# Patient Record
Sex: FEMALE | Race: WHITE | Employment: PART TIME | URBAN - METROPOLITAN AREA
[De-identification: names, ages, dates, MRNs, and addresses within clinical notes are randomized per-mention and may not be internally consistent; named-entity substitution may affect disease eponyms.]

---

## 2019-10-09 ENCOUNTER — HOSPITAL ENCOUNTER (OUTPATIENT)
Dept: PHYSICAL THERAPY | Age: 69
Discharge: HOME | End: 2019-10-09
Payer: COMMERCIAL

## 2019-10-09 PROCEDURE — 97162 PT EVAL MOD COMPLEX 30 MIN: CPT

## 2019-10-09 PROCEDURE — 97110 THERAPEUTIC EXERCISES: CPT

## 2019-10-09 NOTE — PROGRESS NOTES
2255   PHYSICAL THERAPY AT [facility] [address] [place], [address]  Phone: [phone]  Fax: [phone] / [address] [facility]  Patient Name: Juana Lisa : 1950   Medical   Diagnosis: Back pain [M54.9] Treatment Diagnosis: LBP   Onset Date: 2-3 months ago     Referral Source: Erica Chance MD [facility]): 10/9/2019   Prior Hospitalization: See medical history Provider #: [id number]   Prior Level of Function: Independent all ADL's   Comorbidities: Arthritis, thyroid, Asthma, Stroke, R TKR   Medications: Verified on Patient Summary List   The Plan of Care and following information is based on the information from the initial evaluation.   ==================================================================================  Assessment / key information:  Patient is a 68 y.o. female who presents to [facility] with Dx of LBP and postural disfunction. Patient reports initial onset of sx on 2-3 months ago in her L/S which were of unknown etiology. She reports a period of LE weakness that has since resolved. No imaging at this time. Denies N&T down the legs. Lives in a single story home with 4-5 steps w/ rail to enter. Reports 2-3 year h/o worsening postural discomfort . Patient reports sx are intermittent in nature with worsening of sx with increased standing time >1 hour. Sx improve with heat, stretching. Also reports left hip bursitis. Average reported pain level at 2-3/10, 5-6/10 at worst & 0/10 at best located along sacral borders bilaterally. [facility] objective evaluation patient demonstrates Forward head posture and dowager's hump. Decreased segmental mobility throughout T/S and L/S with joint mobilization.   SI alignment presents with sacral rotation to the left, L ASIS elevated and inflared to 13.5cm(vs14.5 on R) indicating L out flare and posterior innominate vs R anterior. MMT reveals 4/5 throughout bilateral hip rotators, extensor, and abductors. 4/5 scapular stabilizers and lower trapezius. Patient can benefit from PT interventions to improve posture, ROM, strength, SI alignment, decrease pain, to facilitate ADLs & overall functional status.   ==================================================================================  Eval Complexity: History HIGH Complexity :3+ comorbidities / personal factors will impact the outcome/ POC ;  Examination  MEDIUM Complexity : 3 Standardized tests and measures addressing body structure, function, activity limitation and / or participation in recreation ; Presentation MEDIUM Complexity : Evolving with changing characteristics ; Decision Making MEDIUM Complexity : FOTO score of 26-74; Overall Complexity MEDIUM  Problem List: pain affecting function, decrease ROM, decrease strength, impaired gait/ balance, decrease ADL/ functional abilitiies, decrease activity tolerance, decrease flexibility/ joint mobility and other FOTO 68/100   Treatment Plan may include any combination of the following: Therapeutic exercise, Therapeutic activities, Neuromuscular re-education, Physical agent/modality, Gait/balance training, Manual therapy and Patient education  Patient / Family readiness to learn indicated by: asking questions and trying to perform skills  Persons(s) to be included in education: patient (P)  Barriers to Learning/Limitations: None  Measures taken:    Patient Goal (s): \"Strengthen muscles, improve posture. \"   Patient self reported health status: good  Rehabilitation Potential: good   Short Term Goals: To be accomplished in  2-3  weeks:  1) Patient to be independent and compliant with initial HEP to prevent further disability. 2) Patient will report decreased c/o pain to < or = 2/10 at worst  to facilitate increased standing tolerance to >30 w/o pain with manageable sx in the lower and upper back.   3) Increase GROC to +2 indicating improved functional mobility   Long Term Goals: To be accomplished in  4-6  weeks:  1)Patient to be independent & compliant with progressive HEP in preparation for D/C.  2)  Patient will tolerate standing >1 hour w/o pain to allow for patient to cook dinner and wash dishes. 3) Improve FOTO score from 68 points to > or = 69 points indicating improved tolerance with ADLs in regards to lower back. Frequency / Duration:   Patient to be seen  2-3  times per week for 4-6  weeks:  Patient / Caregiver education and instruction: self care, activity modification and exercises  G-Codes (GP): n/a  Therapist Signature: Akira Robles PT Date: 52/4/8734   Certification Period: 10/9/19 to 1/7/20 Time: 8:26 AM   ==================================================================================  I certify that the above Physical Therapy Services are being furnished while the patient is under my care. I agree with the treatment plan and certify that this therapy is necessary. Physician Signature:        Date:       Time:     Please sign and return to [facility] or you may fax the signed copy to [phone]. Thank you.

## 2019-10-09 NOTE — PROGRESS NOTES
PHYSICAL THERAPY - DAILY TREATMENT NOTE     Patient Name: Avinash Vazquez        Date: 10/9/2019  : 1950   YES Patient  Verified  Visit #:      12  Insurance: Payor: /      In time: 1100 Out time: 1200   Total Treatment Time: 60     Medicare/BCBS Time Tracking (below)   Total Timed Codes (min):   1:1 Treatment Time:       TREATMENT AREA =  Back pain [M54.9]    SUBJECTIVE    Pain Level (on 0 to 10 scale):  0  / 10   Medication Changes/New allergies or changes in medical history, any new surgeries or procedures?     NO    If yes, update Summary List   Subjective Functional Status/Changes:  []  No changes reported       See POC         OBJECTIVE  Modalities Rationale:     decrease pain and increase tissue extensibility to improve patient's ability to perform ADL's w/o pain      min [] Estim, type/location:                                      []  att     []  unatt     []  w/US     []  w/ice    []  w/heat    min []  Mechanical Traction: type/lbs                   []  pro   []  sup   []  int   []  cont    []  before manual    []  after manual    min []  Ultrasound, settings/location:      min []  Iontophoresis w/ dexamethasone, location:                                               []  take home patch       []  in clinic   10 min []  Ice     [x]  Heat    location/position: Supine to C/S, T/S and L/S    min []  Vasopneumatic Device, press/temp:     min []  Other:    [x] Skin assessment post-treatment (if applicable):    [x]  intact    [x]  redness- no adverse reaction     []redness  adverse reaction:      15 min Therapeutic Exercise:  [x]  See flow sheet   Rationale:      increase ROM and increase strength to improve the patients ability to perform ADL's     Billed With/As:   [x] TE   [] TA   [] Neuro   [] Self Care Patient Education: [x] Review HEP    [] Progressed/Changed HEP based on:   [] positioning   [] body mechanics   [] transfers   [] heat/ice application    [] other:        Other Objective/Functional Measures:    See POC  See TE Flow sheet     Post Treatment Pain Level (on 0 to 10) scale:   0  / 10     ASSESSMENT    Assessment/Changes in Function:     See POC     []  See Progress Note/Recertification   Patient will continue to benefit from skilled PT services to modify and progress therapeutic interventions, address functional mobility deficits, address ROM deficits, address strength deficits, analyze and address soft tissue restrictions, analyze and cue movement patterns, analyze and modify body mechanics/ergonomics and assess and modify postural abnormalities to attain remaining goals.       Progress toward goals / Updated goals:    See POC     PLAN    [x]  Upgrade activities as tolerated YES Continue plan of care   []  Discharge due to :    []  Other:      Therapist: Mariah De León PT    Date: 10/9/2019 Time: 8:26 AM     Future Appointments   Date Time Provider Omar Brooks   10/9/2019 11:00 AM Alexa Ferguson, PT REHAB CENTER AT [facility]

## 2019-10-18 ENCOUNTER — HOSPITAL ENCOUNTER (OUTPATIENT)
Dept: PHYSICAL THERAPY | Age: 69
Discharge: HOME | End: 2019-10-18
Payer: COMMERCIAL

## 2019-10-18 PROCEDURE — 97140 MANUAL THERAPY 1/> REGIONS: CPT

## 2019-10-18 PROCEDURE — 97110 THERAPEUTIC EXERCISES: CPT

## 2019-10-18 NOTE — PROGRESS NOTES
PHYSICAL THERAPY - DAILY TREATMENT NOTE     Patient Name: Avinash Vazquez        Date: 10/18/2019  : 1950   YES Patient  Verified  Visit #:     Insurance: Payor: Noa Gallegos / Plan: [address] PT / Product Type: Commerical /      In time: 1100 Out time: 9565   Total Treatment Time: 70     Medicare/BCBS Time Tracking (below)   Total Timed Codes (min):   1:1 Treatment Time:       TREATMENT AREA =  Back pain [M54.9]    SUBJECTIVE    Pain Level (on 0 to 10 scale):  0  / 10   Medication Changes/New allergies or changes in medical history, any new surgeries or procedures? NO    If yes, update Summary List   Subjective Functional Status/Changes:  []  No changes reported       Functional improvements: \"I feel pretty good. My hip bursitis is better with the alignment correction. \"  Functional impairments: Decreased strength increased pain affecting mobility and function         OBJECTIVE  Modalities Rationale:     decrease pain and increase tissue extensibility to improve patient's ability to perform ADL's w/o pain      min [] Estim, type/location:                                      []  att     []  unatt     []  w/US     []  w/ice    []  w/heat    min []  Mechanical Traction: type/lbs                   []  pro   []  sup   []  int   []  cont    []  before manual    []  after manual    min []  Ultrasound, settings/location:      min []  Iontophoresis w/ dexamethasone, location:                                               []  take home patch       []  in clinic   10 min []  Ice     [x]  Heat    location/position: Supine L/S through C/S    min []  Vasopneumatic Device, press/temp:     min []  Other:    [x] Skin assessment post-treatment (if applicable):    [x]  intact    [x]  redness- no adverse reaction     []redness  adverse reaction:      45 min Therapeutic Exercise:  [x]  See flow sheet   Rationale:      increase ROM and increase strength to improve the patients ability to perform ADL's     15 min Manual Therapy: Technique:      [x] S/DTM []IASTM []PROM [] Passive Stretching   [x]manual TPR    []Jt manipulation:Gr I [] II []  III [] IV[] V[]  Treatment Area:  ITB, glut med   Rationale:      decrease pain, increase ROM and increase tissue extensibility to improve patient's ability to perform ADL's    Billed With/As:   [x] TE   [] TA   [] Neuro   [] Self Care Patient Education: [x] Review HEP    [] Progressed/Changed HEP based on:   [] positioning   [] body mechanics   [] transfers   [] heat/ice application    [] other:        Other Objective/Functional Measures:    Began therex per flow sheet   Post Treatment Pain Level (on 0 to 10) scale:   0  / 10     ASSESSMENT    Assessment/Changes in Function:     Good tolerance to therex program today. Improved alignment. Continues to have increased fatigue. []  See Progress Note/Recertification   Patient will continue to benefit from skilled PT services to modify and progress therapeutic interventions, address functional mobility deficits, address ROM deficits, address strength deficits, analyze and address soft tissue restrictions, analyze and cue movement patterns, analyze and modify body mechanics/ergonomics and assess and modify postural abnormalities to attain remaining goals.       Progress toward goals / Updated goals:    Progressing towards recently established goals     PLAN    [x]  Upgrade activities as tolerated YES Continue plan of care   []  Discharge due to :    []  Other:      Therapist: Tianna Dawn PT    Date: 10/18/2019 Time: 8:26 AM     Future Appointments   Date Time Provider Omar Brooks   10/18/2019 11:00 AM Beuford Meigs, PT REHAB CENTER AT [facility]   10/23/2019 11:00 AM Beuford Meigs, PT REHAB CENTER AT [facility]   10/25/2019 10:30 AM Beuford Meigs, PT REHAB CENTER AT [facility]   10/30/2019 11:00 AM Beuford Meigs, PT REHAB CENTER AT [facility]   11/6/2019 11:00 AM Beuford Meigs, PT REHAB CENTER AT [facility]   11/13/2019 11:00 AM Beuford Meigs, PT REHAB CENTER AT [facility]   11/15/2019 11:00 AM Trish Nieto PT REHAB CENTER AT [facility]   11/20/2019 11:00 AM Trish Nieto PT REHAB CENTER AT [facility]

## 2019-10-23 ENCOUNTER — HOSPITAL ENCOUNTER (OUTPATIENT)
Dept: PHYSICAL THERAPY | Age: 69
Discharge: HOME | End: 2019-10-23
Payer: COMMERCIAL

## 2019-10-23 PROCEDURE — 97140 MANUAL THERAPY 1/> REGIONS: CPT

## 2019-10-23 PROCEDURE — 97110 THERAPEUTIC EXERCISES: CPT

## 2019-10-23 NOTE — PROGRESS NOTES
PHYSICAL THERAPY - DAILY TREATMENT NOTE     Patient Name: Clive Perkins        Date: 10/23/2019  : 1950   YES Patient  Verified  Visit #:   3   of   12  Insurance: Payor: Preet Du / Plan: [address] PT / Product Type: Commerical /      In time: 2905 Out time: 4445   Total Treatment Time: 70     Medicare/[facility] Time Tracking (below)   Total Timed Codes (min):   1:1 Treatment Time:       TREATMENT AREA =  Back pain [M54.9]    SUBJECTIVE    Pain Level (on 0 to 10 scale):  0  / 10   Medication Changes/New allergies or changes in medical history, any new surgeries or procedures? NO    If yes, update Summary List   Subjective Functional Status/Changes:  []  No changes reported       Functional improvements: \"I was so sore after the last visit. I could tell I hadn't used these muscles in a long time. \"  Functional impairments: Decreased strength affecting ADL's         OBJECTIVE  Modalities Rationale:     decrease pain and increase tissue extensibility to improve patient's ability to perform ADL's w/o pain      min [] Estim, type/location:                                      []  att     []  unatt     []  w/US     []  w/ice    []  w/heat    min []  Mechanical Traction: type/lbs                   []  pro   []  sup   []  int   []  cont    []  before manual    []  after manual    min []  Ultrasound, settings/location:      min []  Iontophoresis w/ dexamethasone, location:                                               []  take home patch       []  in clinic   10 min []  Ice     [x]  Heat    location/position: Supine to spine and R shoulder    min []  Vasopneumatic Device, press/temp:     min []  Other:    [x] Skin assessment post-treatment (if applicable):    [x]  intact    [x]  redness- no adverse reaction     []redness  adverse reaction:      35 min Therapeutic Exercise:  [x]  See flow sheet   Rationale:      increase ROM and increase strength to improve the patients ability to perform work activities without pain/fatigue     25 min Manual Therapy: Technique:      [x] S/DTM []IASTM []PROM [] Passive Stretching   [x]manual TPR    []Jt manipulation:Gr I [] II []  III [] IV[] V[]  Treatment Area:  L glut med, piriformis, QL   Rationale:      decrease pain, increase ROM and increase tissue extensibility to improve patient's ability to perform ADL's and maintain alignment    Billed With/As:   [x] TE   [] TA   [] Neuro   [] Self Care Patient Education: [x] Review HEP    [] Progressed/Changed HEP based on:   [] positioning   [] body mechanics   [] transfers   [] heat/ice application    [] other:        Other Objective/Functional Measures: Added deadbug and 1/2 foam roll for T/S ext     Post Treatment Pain Level (on 0 to 10) scale:   0  / 10     ASSESSMENT    Assessment/Changes in Function:     Improved soft tissue mobility and pain following MT. Improved tolerance to therex w/ break down of repetitions. []  See Progress Note/Recertification   Patient will continue to benefit from skilled PT services to modify and progress therapeutic interventions, address functional mobility deficits, address ROM deficits, address strength deficits, analyze and address soft tissue restrictions, analyze and cue movement patterns, analyze and modify body mechanics/ergonomics and assess and modify postural abnormalities to attain remaining goals.       Progress toward goals / Updated goals:    Good progress towards STG's today     PLAN    [x]  Upgrade activities as tolerated YES Continue plan of care   []  Discharge due to :    []  Other:      Therapist: Tianna Dawn PT    Date: 10/23/2019 Time: 8:28 AM     Future Appointments   Date Time Provider Omar Brooks   10/23/2019 11:00 AM Beuford Meigs, PT REHAB CENTER AT [facility]   10/25/2019 10:30 AM Beuford Meigs, PT REHAB CENTER AT [facility]   10/30/2019 11:00 AM Beuford Meigs, PT REHAB CENTER AT [facility]   11/6/2019 11:00 AM Beuford Meigs, PT REHAB CENTER AT [facility]   11/13/2019 11:00 AM Beuford Meigs, PT REHAB CENTER AT [facility]   11/15/2019 11:00 AM Alexa Ferguson, PT REHAB CENTER AT [facility]   11/20/2019 11:00 AM Alexa Ferguson, PT REHAB CENTER AT [facility]

## 2019-10-25 ENCOUNTER — HOSPITAL ENCOUNTER (OUTPATIENT)
Dept: PHYSICAL THERAPY | Age: 69
Discharge: HOME | End: 2019-10-25
Payer: COMMERCIAL

## 2019-10-25 PROCEDURE — 97140 MANUAL THERAPY 1/> REGIONS: CPT

## 2019-10-25 PROCEDURE — 97110 THERAPEUTIC EXERCISES: CPT

## 2019-10-25 NOTE — PROGRESS NOTES
PHYSICAL THERAPY - DAILY TREATMENT NOTE     Patient Name: Uli Worley        Date: 10/25/2019  : 1950   YES Patient  Verified  Visit #:     Insurance: Payor: Isabel Muñoz / Plan: [address] PT / Product Type: Commerical /      In time: 5640 Out time: 1140   Total Treatment Time: 65     Medicare/BCBS Time Tracking (below)   Total Timed Codes (min):   1:1 Treatment Time:       TREATMENT AREA =  Back pain [M54.9]    SUBJECTIVE    Pain Level (on 0 to 10 scale):  0  / 10   Medication Changes/New allergies or changes in medical history, any new surgeries or procedures? NO    If yes, update Summary List   Subjective Functional Status/Changes:  []  No changes reported       Functional improvements: \"I'm noticing I can stand about 40 min before I reach 5-6/10 pain. \"  Functional impairments: Decreased core strength and          OBJECTIVE  Modalities Rationale:     decrease pain and increase tissue extensibility to improve patient's ability to perform work related activities without pain      min [] Estim, type/location:                                      []  att     []  unatt     []  w/US     []  w/ice    []  w/heat    min []  Mechanical Traction: type/lbs                   []  pro   []  sup   []  int   []  cont    []  before manual    []  after manual    min []  Ultrasound, settings/location:      min []  Iontophoresis w/ dexamethasone, location:                                               []  take home patch       []  in clinic   10 min []  Ice     [x]  Heat    location/position: Supine to T/S, C/S    min []  Vasopneumatic Device, press/temp:     min []  Other:    [x] Skin assessment post-treatment (if applicable):    [x]  intact    [x]  redness- no adverse reaction     []redness  adverse reaction:      40 min Therapeutic Exercise:  [x]  See flow sheet   Rationale:      increase ROM and increase strength to improve the patients ability to perform ADl's     15 min Manual Therapy: Technique:      [x] S/DTM []IASTM []PROM [] Passive Stretching   []manual TPR    []Jt manipulation:Gr I [] II []  III [x] IV[] V[]  Treatment Area:  STM to left QL, PA mobs to T/S in sitting   Rationale:      decrease pain, increase ROM and increase tissue extensibility to improve patient's ability to perform work related tasks without pain    Billed With/As:   [x] TE   [] TA   [] Neuro   [] Self Care Patient Education: [x] Review HEP    [] Progressed/Changed HEP based on:   [] positioning   [] body mechanics   [] transfers   [] heat/ice application    [] other:        Other Objective/Functional Measures:         Post Treatment Pain Level (on 0 to 10) scale:   0  / 10     ASSESSMENT    Assessment/Changes in Function:     Improved tolerance to therex today     []  See Progress Note/Recertification   Patient will continue to benefit from skilled PT services to modify and progress therapeutic interventions, address functional mobility deficits, address ROM deficits, address strength deficits, analyze and address soft tissue restrictions, analyze and cue movement patterns, analyze and modify body mechanics/ergonomics and assess and modify postural abnormalities to attain remaining goals.       Progress toward goals / Updated goals:    Good progress to STG's     PLAN    [x]  Upgrade activities as tolerated YES Continue plan of care   []  Discharge due to :    []  Other:      Therapist: Bernie Silva PT    Date: 10/25/2019 Time: 10:37 AM     Future Appointments   Date Time Provider Omar Brooks   10/30/2019 11:00 AM Aileen Rasmussen PT REHAB [facility]ENTER AT [facility]   11/6/2019 11:00 AM Aileen Rasmussen PT RE[facility]AB [facility]ENTER AT [facility]   11/13/2019 11:00 AM Aileen Rasmussen PT REHAB CENTER AT [facility]   11/15/2019 11:00 AM Aileen Rasmussen PT REHAB CENTER AT [facility]   11/20/2019 11:00 AM Aileen Rasmussen PT REHAB CENTER AT [facility]

## 2019-10-30 ENCOUNTER — HOSPITAL ENCOUNTER (OUTPATIENT)
Dept: PHYSICAL THERAPY | Age: 69
Discharge: HOME | End: 2019-10-30
Payer: COMMERCIAL

## 2019-10-30 PROCEDURE — 97110 THERAPEUTIC EXERCISES: CPT

## 2019-10-30 PROCEDURE — 97140 MANUAL THERAPY 1/> REGIONS: CPT

## 2019-10-30 NOTE — PROGRESS NOTES
PHYSICAL THERAPY - DAILY TREATMENT NOTE     Patient Name: Shavon Neville        Date: 10/30/2019  : 1950   YES Patient  Verified  Visit #:     Insurance: Payor: Christina Lawton / Plan: [address] / Product Type: Commerical /      In time: 1100 Out time: 4038   Total Treatment Time: 70     Medicare/BCBS Time Tracking (below)   Total Timed Codes (min):   1:1 Treatment Time:       TREATMENT AREA =  Back pain [M54.9]    SUBJECTIVE    Pain Level (on 0 to 10 scale):  0-1  / 10   Medication Changes/New allergies or changes in medical history, any new surgeries or procedures? NO    If yes, update Summary List   Subjective Functional Status/Changes:  []  No changes reported       Functional improvements: \"I was pain free for 3-5 days after the last session. \"  Functional impairments: decreased strength and endurance with walking and standing tolerance         OBJECTIVE  Modalities Rationale:     decrease pain and increase tissue extensibility to improve patient's ability to perform ADL's w/o pain      min [] Estim, type/location:                                      []  att     []  unatt     []  w/US     []  w/ice    []  w/heat    min []  Mechanical Traction: type/lbs                   []  pro   []  sup   []  int   []  cont    []  before manual    []  after manual    min []  Ultrasound, settings/location:      min []  Iontophoresis w/ dexamethasone, location:                                               []  take home patch       []  in clinic   10 min []  Ice     [x]  Heat    location/position: Supine to T/S and C/s    min []  Vasopneumatic Device, press/temp:     min []  Other:    [x] Skin assessment post-treatment (if applicable):    [x]  intact    [x]  redness- no adverse reaction     []redness  adverse reaction:      45 min Therapeutic Exercise:  [x]  See flow sheet   Rationale:      increase ROM and increase strength to improve the patients ability to ambulate with increased endurance 15 min Manual Therapy: Technique:      [x] S/DTM []IASTM []PROM [] Passive Stretching   [x]manual TPR    []Jt manipulation:Gr I [] II []  III [] IV[] V[]  Treatment Area:  L QL   Rationale:      decrease pain, increase ROM and increase tissue extensibility to improve patient's ability to perform ADL's w/o pain    Billed With/As:   [x] TE   [] TA   [] Neuro   [] Self Care Patient Education: [x] Review HEP    [] Progressed/Changed HEP based on:   [] positioning   [] body mechanics   [] transfers   [] heat/ice application    [] other:        Other Objective/Functional Measures:    Discussed a walking program to increase endurance. Post Treatment Pain Level (on 0 to 10) scale:   0  / 10     ASSESSMENT    Assessment/Changes in Function:     Patient continues to make improvement in overall strength and mobility     []  See Progress Note/Recertification   Patient will continue to benefit from skilled PT services to modify and progress therapeutic interventions, address functional mobility deficits, address ROM deficits, address strength deficits, analyze and address soft tissue restrictions, analyze and cue movement patterns, analyze and modify body mechanics/ergonomics and assess and modify postural abnormalities to attain remaining goals.       Progress toward goals / Updated goals:    STG's 1&2 met     PLAN    [x]  Upgrade activities as tolerated YES Continue plan of care   []  Discharge due to :    []  Other:      Therapist: Jonathan Muniz PT    Date: 10/30/2019 Time: 8:26 AM     Future Appointments   Date Time Provider Omar Brooks   10/30/2019 11:00 AM Lázaro Coronel PT [facility]   11/6/2019 11:00 AM Lázaro Coronel PT [facility]   11/13/2019 11:00 AM Lázaro Coronel PT [facility]   11/15/2019 11:00 AM Lázaro Coronel PT [facility]   11/20/2019 11:00 AM Lázaro Coronel, PT [facility] AT [facility]

## 2019-11-06 ENCOUNTER — APPOINTMENT (OUTPATIENT)
Dept: PHYSICAL THERAPY | Age: 69
End: 2019-11-06
Payer: COMMERCIAL

## 2019-11-13 ENCOUNTER — HOSPITAL ENCOUNTER (OUTPATIENT)
Dept: PHYSICAL THERAPY | Age: 69
Discharge: HOME | End: 2019-11-13
Payer: COMMERCIAL

## 2019-11-13 PROCEDURE — 97110 THERAPEUTIC EXERCISES: CPT

## 2019-11-13 NOTE — PROGRESS NOTES
PHYSICAL THERAPY - DAILY TREATMENT NOTE     Patient Name: Chen Hernandez        Date: 2019  : 1950   YES Patient  Verified  Visit #:     Insurance: Payor: Telma Henson / Plan: [address] PT / Product Type: Commerical /      In time: 1130 Out time: 6139   Total Treatment Time: 50     Medicare/BCBS Time Tracking (below)   Total Timed Codes (min):   1:1 Treatment Time:       TREATMENT AREA =  Back pain [M54.9]    SUBJECTIVE    Pain Level (on 0 to 10 scale):  0  / 10   Medication Changes/New allergies or changes in medical history, any new surgeries or procedures? NO    If yes, update Summary List   Subjective Functional Status/Changes:  []  No changes reported       Functional improvements: \"I started walking and I'm up to about 12-14 min\"  Patient states she's only had 1 episode of pain in the low back muscle area since her last visit but states she's only able to stand 15-30 minutes before discomfort starts.   Functional impairments: Decreased core strength and endurance with work and ADL's         OBJECTIVE  Modalities Rationale:     decrease pain and increase tissue extensibility to improve patient's ability to perform ADL's w/o pain      min [] Estim, type/location:                                      []  att     []  unatt     []  w/US     []  w/ice    []  w/heat    min []  Mechanical Traction: type/lbs                   []  pro   []  sup   []  int   []  cont    []  before manual    []  after manual    min []  Ultrasound, settings/location:      min []  Iontophoresis w/ dexamethasone, location:                                               []  take home patch       []  in clinic   10 min []  Ice     [x]  Heat    location/position: Supine to C/S and T/S    min []  Vasopneumatic Device, press/temp:     min []  Other:    [x] Skin assessment post-treatment (if applicable):    [x]  intact    [x]  redness- no adverse reaction     []redness  adverse reaction:      40 min Therapeutic Exercise:  [x]  See flow sheet   Rationale:      increase ROM and increase strength to improve the patients ability to perform ADL's w/ correct body mechanics     Billed With/As:   [x] TE   [] TA   [] Neuro   [] Self Care Patient Education: [x] Review HEP    [] Progressed/Changed HEP based on:   [] positioning   [] body mechanics   [] transfers   [] heat/ice application    [] other:        Other Objective/Functional Measures:    Good SI alignment  Educated on proper standing mechanics to minimize back pain     Post Treatment Pain Level (on 0 to 10) scale:   0  / 10     ASSESSMENT    Assessment/Changes in Function:     See progress note     []  See Progress Note/Recertification   Patient will continue to benefit from skilled PT services to modify and progress therapeutic interventions, address functional mobility deficits, address ROM deficits, address strength deficits, analyze and address soft tissue restrictions, analyze and cue movement patterns, analyze and modify body mechanics/ergonomics and assess and modify postural abnormalities to attain remaining goals.       Progress toward goals / Updated goals:    See Progress Note     PLAN    [x]  Upgrade activities as tolerated YES Continue plan of care   []  Discharge due to :    []  Other:      Therapist: Julia Perkins PT    Date: 11/13/2019 Time: 8:20 AM     Future Appointments   Date Time Provider Omar Brooks   11/13/2019 11:00 AM Marina Fonseca PT [facility]   11/15/2019 11:00 AM Marina Fonseca PT [facility]   11/20/2019 11:00 AM Marina Fonseca PT [facility]

## 2019-11-13 NOTE — PROGRESS NOTES
[address]  [facility] AT [address] [facility], [address] [place] [address], [address] [place] [address], [address] [place] [address]  Phone: [phone]  Fax: [phone]  PROGRESS NOTE  Patient Name: Duyen Rosas : 1950   Treatment/Medical Diagnosis: Back pain [M54.9]   Referral Source: Jemima Lowry MD     Date of Initial Visit: 10/9/19 Attended Visits: 6 Missed Visits: 0     SUMMARY OF TREATMENT  Initial evaluation, manual therapy, instruction in HEP and body mechanics, therapeutic exercise for core strengthening and posture, postural re education, modalities: heat  CURRENT STATUS  Ms. Neymar Becerra has made good gains in physical therapy, consistently reporting decreased pain and improved functional mobility. She states she is now able to stand for 15-30 minutes prior to increased pain (previously 5-10 min) and she is able to walk for approximately 12-14 minutes (previously 5 min). She has increased her core strength and postural endurance and is becoming more independent with her body mechanics at home and at work. Previous Goals:  1) Patient to be independent and compliant with initial HEP to prevent further disability. 2) Patient will report decreased c/o pain to < or = 2/10 at worst  to facilitate increased standing tolerance to >30 w/o pain with manageable sx in the lower and upper back. 3) Increase GROC to +2 indicating improved functional mobility   Prior Level/Current Level:  1) Prior Level: initiated   Current Level: Independent with current HEP   Goal Met? yes  2) Prior Level: 5-6/10   Current Level: 0-3/10 Standing 15-30 min   Goal Met? Progressing  3) Prior Level: N/A   Current Level: +5   Goal Met? yes    New Goals to be achieved in __4__  weeks:  1)Patient to be independent & compliant with progressive HEP in preparation for D/C.  2)  Patient will tolerate standing >30-45 min w/o pain to allow for patient to cook dinner and wash dishes.   3) Improve FOTO score from 68 points to > or = 69 points indicating improved tolerance with ADLs in regards to lower back. G-Codes: N/A  RECOMMENDATIONS  Continue PT 2x/wk for 4 wks in addition to initial plan in order to achieve all LTG's  If you have any questions/comments please contact us directly at [phone]. Thank you for allowing us to assist in the care of your patient. Therapist Signature: Fuad Givens PT Date: 11/13/2019   Reporting Period:  Certification Period:   N/A  N/A Time: 8:27 AM   NOTE TO PHYSICIAN:  PLEASE COMPLETE THE ORDERS BELOW AND FAX TO   [facility] [place]: [phone]. If you are unable to process this request in 24 hours please contact our office: [phone].    ___ I have read the above report and request that my patient continue as recommended.   ___ I have read the above report and request that my patient continue therapy with the following changes/special instructions:_________________________________________   ___ I have read the above report and request that my patient be discharged from therapy.      Physician Signature:        Date:       Time:

## 2019-11-15 ENCOUNTER — HOSPITAL ENCOUNTER (OUTPATIENT)
Dept: PHYSICAL THERAPY | Age: 69
Discharge: HOME | End: 2019-11-15
Payer: COMMERCIAL

## 2019-11-15 PROCEDURE — 97110 THERAPEUTIC EXERCISES: CPT

## 2019-11-15 NOTE — PROGRESS NOTES
PHYSICAL THERAPY - DAILY TREATMENT NOTE     Patient Name: Thierno Sherwood        Date: 11/15/2019  : 1950   YES Patient  Verified  Visit #:     Insurance: Payor: Finesse Smith / Plan: [address] PT / Product Type: Commerical /      In time: 1100 Out time: 1200   Total Treatment Time: 60     Medicare/[facility] Time Tracking (below)   Total Timed Codes (min):   1:1 Treatment Time:       TREATMENT AREA =  Back pain [M54.9]    SUBJECTIVE    Pain Level (on 0 to 10 scale):  0  / 10   Medication Changes/New allergies or changes in medical history, any new surgeries or procedures? NO    If yes, update Summary List   Subjective Functional Status/Changes:  []  No changes reported       Functional improvements: Patient reports no increased low back pain.   Functional impairments: Decreased endurance with ADL's         OBJECTIVE  Modalities Rationale:     decrease pain and increase tissue extensibility to improve patient's ability to perform ADL's      min [] Estim, type/location:                                      []  att     []  unatt     []  w/US     []  w/ice    []  w/heat    min []  Mechanical Traction: type/lbs                   []  pro   []  sup   []  int   []  cont    []  before manual    []  after manual    min []  Ultrasound, settings/location:      min []  Iontophoresis w/ dexamethasone, location:                                               []  take home patch       []  in clinic   10 min []  Ice     [x]  Heat    location/position: Supine to C/s, L/s    min []  Vasopneumatic Device, press/temp:     min []  Other:    [x] Skin assessment post-treatment (if applicable):    [x]  intact    [x]  redness- no adverse reaction     []redness  adverse reaction:      50 min Therapeutic Exercise:  [x]  See flow sheet   Rationale:      increase ROM and increase strength to improve the patients ability to perform ADL's w/o pain     Billed With/As:   [x] TE   [] TA   [] Neuro   [] Self Care Patient Education: [x] Review HEP    [] Progressed/Changed HEP based on:   [] positioning   [] body mechanics   [] transfers   [] heat/ice application    [] other:        Other Objective/Functional Measures:    Progressed to full foam roll     Post Treatment Pain Level (on 0 to 10) scale:   0  / 10     ASSESSMENT    Assessment/Changes in Function:     Better endurance with therex progression     []  See Progress Note/Recertification   Patient will continue to benefit from skilled PT services to modify and progress therapeutic interventions, address functional mobility deficits, address ROM deficits, address strength deficits, analyze and address soft tissue restrictions, analyze and cue movement patterns, analyze and modify body mechanics/ergonomics and assess and modify postural abnormalities to attain remaining goals.       Progress toward goals / Updated goals:    Progressing towards all goals     PLAN    [x]  Upgrade activities as tolerated YES Continue plan of care   []  Discharge due to :    [x]  Other: D/C N/V     Therapist: Verena Emerson PT    Date: 11/15/2019 Time: 8:27 AM     Future Appointments   Date Time Provider Omar Brooks   11/15/2019 11:00 AM Soledad Montanez PT [facility] AT [facility]   11/20/2019 11:00 AM Soledad Montanez PT [facility] AT [facility]

## 2019-11-20 ENCOUNTER — HOSPITAL ENCOUNTER (OUTPATIENT)
Dept: PHYSICAL THERAPY | Age: 69
Discharge: HOME | End: 2019-11-20
Payer: COMMERCIAL

## 2019-11-20 PROCEDURE — 97110 THERAPEUTIC EXERCISES: CPT

## 2019-11-20 NOTE — PROGRESS NOTES
PHYSICAL THERAPY - DAILY TREATMENT NOTE     Patient Name: Mary Sneed        Date: 2019  : 1950   YES Patient  Verified  Visit #:   8      12  Insurance: Payor: Tommie Ruiz / Plan:  [address] PT / Product Type: Commerical /      In time: 1000 Out time: 7815   Total Treatment Time: 45     Medicare/BCBS Time Tracking (below)   Total Timed Codes (min):   1:1 Treatment Time:       TREATMENT AREA =  Back pain [M54.9]    SUBJECTIVE    Pain Level (on 0 to 10 scale):  0  / 10   Medication Changes/New allergies or changes in medical history, any new surgeries or procedures?     NO    If yes, update Summary List   Subjective Functional Status/Changes:  []  No changes reported       Functional improvements: \"I'm doing well\"         OBJECTIVE  Modalities Rationale:     PD   min [] Estim, type/location:                                      []  att     []  unatt     []  w/US     []  w/ice    []  w/heat    min []  Mechanical Traction: type/lbs                   []  pro   []  sup   []  int   []  cont    []  before manual    []  after manual    min []  Ultrasound, settings/location:      min []  Iontophoresis w/ dexamethasone, location:                                               []  take home patch       []  in clinic    min []  Ice     []  Heat    location/position:     min []  Vasopneumatic Device, press/temp:     min []  Other:    [] Skin assessment post-treatment (if applicable):    []  intact    []  redness- no adverse reaction     []redness  adverse reaction:      45 min Therapeutic Exercise:  [x]  See flow sheet   Rationale:      increase ROM and increase strength to improve the patients ability to perform ADL's     Billed With/As:   [x] TE   [] TA   [] Neuro   [] Self Care Patient Education: [x] Review HEP    [] Progressed/Changed HEP based on:   [] positioning   [] body mechanics   [] transfers   [] heat/ice application    [] other:        Other Objective/Functional Measures:    See D/C summary     Post Treatment Pain Level (on 0 to 10) scale:   0  / 10     ASSESSMENT    Assessment/Changes in Function:     See d/C summary     []  See Progress Note/Recertification   Patient will continue to benefit from skilled PT services to modify and progress therapeutic interventions, address functional mobility deficits, address ROM deficits, address strength deficits, analyze and address soft tissue restrictions, analyze and cue movement patterns, analyze and modify body mechanics/ergonomics and assess and modify postural abnormalities to attain remaining goals. Progress toward goals / Updated goals:    See D/C Summary     PLAN    []  Upgrade activities as tolerated NO Continue plan of care   [x]  Discharge due to :  All goals met   []  Other:      Therapist: Neto Davis PT    Date: 11/20/2019 Time: 8:29 AM     Future Appointments   Date Time Provider Omar Brooks   11/20/2019 10:00 AM Jamal Nuñez PT [facility] AT [facility] Pt on heparin gtt, PTT came back greater than 200

## 2019-11-20 NOTE — PROGRESS NOTES
[address]  [facility] AT [address] [facility], [address] [place] [address], [address] [place] [address], [address] [place] [address]  Phone: [phone]  Fax: [phone] SUMMARY  Patient Name: Clive Perkins : 1950   Treatment/Medical Diagnosis: Back pain [M54.9]   Referral Source: Demarcus Espinoza MD     Date of Initial Visit: 10/9/19 Attended Visits: 8 Missed Visits: 0     SUMMARY OF TREATMENT  Therapeutic exercise, body mechanics, manual therapy, modalities: heat, postural education  CURRENT STATUS  Ms. Brandie Hendrix has made good gains in physical therapy consistently reporting decreased pain and increased functional mobility. She states she is able to modify her tasks to relieve symptoms and is independent with her ADL's. She is able to ambulate and stand for greater periods of time before symptoms increase. Goal/Measure of Progress Goal Met? 1. Patient to be independent & compliant with progressive HEP in preparation for D/C. Status at last Eval:  Current Status: Independent yes   2. Patient will tolerate standing >30-45 min w/o pain to allow for patient to cook dinner and wash dishes. Status at last Eval: 10 min Current Status: 20-30 min yes   3. Improve FOTO score from 68 points to > or = 69 points indicating improved tolerance with ADLs in regards to lower back. Status at last Eval: 68 Current Status: 59; verbal reports of significant improvemt yes     RECOMMENDATIONS  Discontinue therapy. Progressing towards or have reached established goals. If you have any questions/comments please contact us directly at  [phone]. Thank you for allowing us to assist in the care of your patient.     Therapist Signature: Shannon Ridley PT Date: 19     Time: 8:30 AM

## 2021-09-07 ENCOUNTER — HOSPITAL ENCOUNTER (OUTPATIENT)
Dept: PHYSICAL THERAPY | Age: 71
Discharge: HOME | End: 2021-09-07
Payer: COMMERCIAL

## 2021-09-07 PROCEDURE — 97110 THERAPEUTIC EXERCISES: CPT

## 2021-09-07 PROCEDURE — 97162 PT EVAL MOD COMPLEX 30 MIN: CPT

## 2021-09-07 NOTE — PROGRESS NOTES
PHYSICAL THERAPY - DAILY TREATMENT NOTE     Patient Name: Liliana Mitchell        Date: 2021  : 1950   YES Patient  Verified  Visit #:   1     Insurance: Payor: Norman Watson / Plan: [address] PT / Product Type: Commerical /      In time: 7146 Out time: 6345   Total Treatment Time: 60     Medicare/[facility] Time Tracking (below)   Total Timed Codes (min):   1:1 Treatment Time:       TREATMENT AREA =  Pain in left foot [M79.672]  Pain in left hip [M25.552]    SUBJECTIVE    Pain Level (on 0 to 10 scale):  2  / 10   Medication Changes/New allergies or changes in medical history, any new surgeries or procedures?     NO    If yes, update Summary List   Subjective Functional Status/Changes:  []  No changes reported       See POC         OBJECTIVE  Modalities Rationale:     decrease edema, decrease inflammation and decrease pain to improve patient's ability to ambulate w/o pain      min [] Estim, type/location:                                      []  att     []  unatt     []  w/US     []  w/ice    []  w/heat    min []  Mechanical Traction: type/lbs                   []  pro   []  sup   []  int   []  cont    []  before manual    []  after manual    min []  Ultrasound, settings/location:      min []  Iontophoresis w/ dexamethasone, location:                                               []  take home patch       []  in clinic   10 min [x]  Ice     []  Heat    location/position: Supine w/ elevation to left fore foot and ankle    min []  Vasopneumatic Device, press/temp:     min []  Other:    [x] Skin assessment post-treatment (if applicable):    [x]  intact    [x]  redness- no adverse reaction     []redness  adverse reaction:      15 min Therapeutic Exercise:  [x]  See flow sheet   Rationale:      increase ROM and increase strength to improve the patients ability to perform ADL's and improve ambulation     Billed With/As:   [x] TE   [] TA   [] Neuro   [] Self Care Patient Education: [x] Review HEP [] Progressed/Changed HEP based on:   [] positioning   [] body mechanics   [] transfers   [] heat/ice application    [] other:        Other Objective/Functional Measures:    See POC  See TE Flow sheet     Post Treatment Pain Level (on 0 to 10) scale:   0  / 10     ASSESSMENT    Assessment/Changes in Function:     See POC     []  See Progress Note/Recertification   Patient will continue to benefit from skilled PT services to modify and progress therapeutic interventions, address functional mobility deficits, address ROM deficits, address strength deficits, analyze and address soft tissue restrictions, analyze and cue movement patterns, analyze and modify body mechanics/ergonomics and assess and modify postural abnormalities to attain remaining goals.       Progress toward goals / Updated goals:    See POC     PLAN    [x]  Upgrade activities as tolerated YES Continue plan of care   []  Discharge due to :    []  Other:      Therapist: Ehsan Miranda, PT    Date: 9/7/2021 Time: 8:33 AM     Future Appointments   Date Time Provider Omar Brooks   9/7/2021 10:00 AM Isaias Castro, PT [facility] [address]

## 2021-09-07 NOTE — PROGRESS NOTES
[address] [facility] PHYSICAL THERAPY AT [address] 95 [facility], [address] [place] [address] Way, [address], [address] [place] [address]  Phone: [phone]  Fax: [phone] / [address] [facility]  Patient Name: Aron Rinne : 1950   Medical   Diagnosis: Pain in left foot [M79.672]  Pain in left hip [M25.552] Treatment Diagnosis: Pain left foot/hip   Onset Date: 7/15/21     Referral Source: Reina Maxwell MD [facility]): 2021   Prior Hospitalization: See medical history Provider #: [id number]   Prior Level of Function: Independent all ADL's   Comorbidities: Arthritis, Thyroid, Asthma, Stroke   Medications: Verified on Patient Summary List   The Plan of Care and following information is based on the information from the initial evaluation.   ==================================================================================  Assessment / key information:  Patient is a 65 y.o. female who presents to [facility]  with Dx of Left hip and foot pain. Patient reports initial onset of sx on 7/15/21 following surgery to 2nd and 3rd metatarsals, capsulotomy of 2-5th toes, open tendon lengthening, Plantar plate repair of 2-3 metatarsophalangeal joint and emory release. Patient also reports improving left hip pain which started secondary to walking in surgical boot. Patient reports sx are intermittent in nature with worsening of sx with walking. Sx improve with rest.  Average reported pain level at 2-4/10, 8-10/10 at worst & 2/10 at best. Patient lives in single story home with 5 to enter and only 1 step inside. Patient is able to navigate with step to gait pattern with spouse near for support. Upon objective evaluation patient demonstrates ambulation with FWW WBAT in surgical shoe. PROM of left ankle is WFL; AROM: DF to neutral, EV/IV to 30 deg, AROM of all toes limited by 75%, PROM WFL for great toe with limited PROM 2nd and 3rd. Incisions closed and healing without signs of infection; remaining stitch noticed in 5th toe and patient instructed to contact MD regarding removal.  MMT 3-/5 all planes and intrinsic musculature. Left hip MMT 3/5 ABD, ext, ER/IR with +TTP to left greater trochanter. Helio. a noted in forefoot doral and plantar surfaces (fig 8 51 cm/ 21 cm over met heads) Patient can benefit from PT interventions to improve strength, ROM, balance, ambulation, functional gait, decrease pain and edema, to facilitate ADLs & overall functional status.   ==================================================================================  Eval Complexity: History HIGH Complexity :3+ comorbidities / personal factors will impact the outcome/ POC ;  Examination  MEDIUM Complexity : 3 Standardized tests and measures addressing body structure, function, activity limitation and / or participation in recreation ; Presentation MEDIUM Complexity : Evolving with changing characteristics ;   Decision Making MEDIUM Complexity : FOTO score of 26-74; Overall Complexity MEDIUM  Problem List: pain affecting function, decrease ROM, decrease strength, edema affecting function, impaired gait/ balance, decrease ADL/ functional abilitiies, decrease activity tolerance, decrease flexibility/ joint mobility and other FOTO  40/100   Treatment Plan may include any combination of the following: Therapeutic exercise, Therapeutic activities, Neuromuscular re-education, Physical agent/modality, Gait/balance training, Manual therapy, Patient education, Self Care training, Functional mobility training and Stair training  Patient / Family readiness to learn indicated by: asking questions, trying to perform skills and interest  Persons(s) to be included in education: patient (P)  Barriers to Learning/Limitations: None  Measures taken:    Patient Goal (s): \"Improve left hip and foot pain\"   Patient self reported health status: good  Rehabilitation Potential: good   Short Term Goals: To be accomplished in  2-3  weeks:  1) Patient to be independent and compliant with initial HEP to prevent further disability. 2) Patient will report decreased c/o pain to < or = 2/10 at worst  to facilitate increased standing and walking tolerance with manageable sx in the left foot and hip. 3) Patient to report 50% improvement in overall function in preparation for return to recreational activities with manageable sx in the left foot and hip.  Long Term Goals: To be accomplished in  4-6  weeks:  1) Patient to be independent & compliant with progressive HEP in preparation for D/C.  2) Patient to ambulate with normal gait on all surfaces and reciprocal gait pattern on stairs w/o AD to allow patient to return to PLF and work related activities. 3) Patient to increase FOTO to >/= to 52 indicating improved functional mobility and independence. Frequency / Duration:   Patient to be seen  2  times per week for 4-6  weeks:  Patient / Caregiver education and instruction: self care, activity modification, brace/ splint application and exercises  G-Codes (GP): n/a  Therapist Signature: Lawrence Closs, PT Date: 8/4/1808   Certification Period: n/a Time: 8:34 AM   ===========================================================================================  I certify that the above Physical Therapy Services are being furnished while the patient is under my care. I agree with the treatment plan and certify that this therapy is necessary. Physician Signature:        Date:       Time:        Odalys Chandler, *  Please sign and return to [facility] or you may fax the signed copy to [phone]. Thank you.

## 2021-09-16 ENCOUNTER — APPOINTMENT (OUTPATIENT)
Dept: PHYSICAL THERAPY | Age: 71
End: 2021-09-16
Payer: COMMERCIAL

## 2021-09-16 ENCOUNTER — HOSPITAL ENCOUNTER (OUTPATIENT)
Dept: PHYSICAL THERAPY | Age: 71
Discharge: HOME | End: 2021-09-16
Payer: COMMERCIAL

## 2021-09-16 PROCEDURE — 97535 SELF CARE MNGMENT TRAINING: CPT

## 2021-09-16 PROCEDURE — 97110 THERAPEUTIC EXERCISES: CPT

## 2021-09-16 PROCEDURE — 97140 MANUAL THERAPY 1/> REGIONS: CPT

## 2021-09-16 NOTE — PROGRESS NOTES
PHYSICAL THERAPY - DAILY TREATMENT NOTE     Patient Name: Sarah Carlson        Date: 2021  : 1950   YES Patient  Verified  Visit #:     12  Insurance: Payor: Malik Cornea / Plan: [address] PT / Product Type: Commerical /      In time: 2:50  Out time: 3:44   Total Treatment Time: 54     Medicare/[facility] Time Tracking (below)   Total Timed Codes (min):  - 1:1 Treatment Time:  -     TREATMENT AREA =  Pain in left foot [M79.672]  Pain in left hip [M25.552]    SUBJECTIVE    Pain Level (on 0 to 10 scale):  0 / 10   Medication Changes/New allergies or changes in medical history, any new surgeries or procedures?     NO    If yes, update Summary List   Subjective Functional Status/Changes:  []  No changes reported       Functional improvements:no boot ~ 2 weeks  Functional impairments: edema/ tingling  in left great toe new since surgery but felt it prior to surgery , ambulation with RW, poor sleeping tolerance, pain left hip        OBJECTIVE  Modalities Rationale:     decrease edema, decrease inflammation, decrease pain and increase tissue extensibility to improve patient's ability to perform community ambulation   min [] Estim, type/location:                                      []  att     []  unatt     []  w/US     []  w/ice    []  w/heat    min []  Mechanical Traction: type/lbs                   []  pro   []  sup   []  int   []  cont    []  before manual    []  after manual    min []  Ultrasound, settings/location:      min []  Iontophoresis w/ dexamethasone, location:                                               []  take home patch       []  in clinic   10 min [x]  Ice     []  Heat    location/position: Supine LE elevated Left post knee/left hip    min []  Vasopneumatic Device, press/temp:    If using vaso (only need to measure limb vaso being performed on)      pre-treatment girth :       post-treatment girth :       measured at (landmark location) :      min []  Other:    [x] Skin assessment post-treatment (if applicable):    [x]  intact    []  redness- no adverse reaction                  []redness  adverse reaction:      25 min Therapeutic Exercise:  [x]  See flow sheet   Rationale:      increase ROM and increase strength to improve the patients ability to perform standing, walking , sleeping through the night   9 min Manual Therapy: Technique:      [] S/DTM []IASTM []PROM [] Passive Stretching   []manual TPR    []Jt manipulation:Gr I [] II []  III [] IV[] V[]  Treatment Area:  effleurage massage to left foot/ankle   Rationale:      decrease pain, increase ROM, increase tissue extensibility and decrease edema  to improve patient's ability toimprove tissue mobility in functional ADLs  The manual therapy interventions were performed at a separate and distinct time from the therapeutic activities interventions. 10 min Billed With/As:   [] TE   [] TA   [] Neuro   [] Self Care Patient Education: [x] Review HEP  , pt education in positioning, use of HEP, ice , activity modification for self management of sx. [] Progressed/Changed HEP based on:   [] positioning   [] body mechanics   [] transfers   [] heat/ice application    [] other:        Other Objective/Functional Measures: Add glut sets/abdominal draws  Review initial HEP     Post Treatment Pain Level (on 0 to 10) scale:   0  / 10     ASSESSMENT    Assessment/Changes in Function:     Extensive VCs and demo for proper technique in ankle alphabet to isolate movement thougth the ankle versus great toe.    Review initial HEP  Pt instruction in efflourage massage to foot/ ankle for edema management  Noted decreased left foot edema after session     []  See Progress Note/Recertification   Patient will continue to benefit from skilled PT services to modify and progress therapeutic interventions, address functional mobility deficits, address ROM deficits, address strength deficits, analyze and address soft tissue restrictions and instruct in home and community integration to attain remaining goals.       Progress toward goals / Updated goals:    Good Progress to    [x] STG    [] LTG  1 as shown by HEP currently in progress       PLAN    [x]  Upgrade activities as tolerated YES Continue plan of care   []  Discharge due to :    []  Other:      Therapist: Perla Vasquez PTA    Date: 9/16/2021 Time: 3:44  PM     Future Appointments   Date Time Provider Omar Brooks   9/16/2021  3:00 PM Stuart Hurd PTA [facility]   9/21/2021 11:45 AM Noa Haney, PT [facility]   9/23/2021  3:00 PM Stuart Hurd PTA [facility]   9/30/2021 11:45 AM Noa Haney, PT [facility]   10/5/2021 11:45 AM Aaron Giraldo, PT [facility]   10/7/2021 11:45 AM Noa Haney, PT [facility]   10/12/2021 11:45 AM Aaron Giraldo, PT [facility]   10/14/2021 11:45 AM Noa Haney, PT [facility]

## 2021-09-21 ENCOUNTER — HOSPITAL ENCOUNTER (OUTPATIENT)
Dept: PHYSICAL THERAPY | Age: 71
Discharge: HOME | End: 2021-09-21
Payer: COMMERCIAL

## 2021-09-21 PROCEDURE — 97140 MANUAL THERAPY 1/> REGIONS: CPT

## 2021-09-21 PROCEDURE — 97530 THERAPEUTIC ACTIVITIES: CPT

## 2021-09-21 PROCEDURE — 97110 THERAPEUTIC EXERCISES: CPT

## 2021-09-21 NOTE — PROGRESS NOTES
PHYSICAL THERAPY - DAILY TREATMENT NOTE     Patient Name: Willis Gear        Date: 2021  : 1950   YES Patient  Verified  Visit #:  3 of   12  Insurance: Payor: Hira Arellano / [address] / Product Type: Commerical /      In time: 1140 Out time: 319   Total Treatment Time: 60     Medicare/[facility] Time Tracking (below)   Total Timed Codes (min):  - 1:1 Treatment Time:  -     TREATMENT AREA =  Pain in left foot [M79.672]  Pain in left hip [M25.552]    SUBJECTIVE    Pain Level (on 0 to 10 scale):  0 / 10   Medication Changes/New allergies or changes in medical history, any new surgeries or procedures? NO    If yes, update Summary List   Subjective Functional Status/Changes:  []  No changes reported       Functional improvements: Pt reports slight improvement in strength in LE's with starting PT. Functional impairments: Pt performing ambulation with RW, limited sleep duration and increased symptoms, pain left hip with ADLs.        OBJECTIVE  Modalities Rationale:     decrease edema, decrease inflammation, decrease pain and increase tissue extensibility to improve patient's ability to perform amb   min [] Estim, type/location:                                      []  att     []  unatt     []  w/US     []  w/ice    []  w/heat    min []  Mechanical Traction: type/lbs                   []  pro   []  sup   []  int   []  cont    []  before manual    []  after manual    min []  Ultrasound, settings/location:      min []  Iontophoresis w/ dexamethasone, location:                                               []  take home patch       []  in clinic   10 min [x]  Ice     []  Heat    location/position: Supine LE elevation with CP L posterior knee and L anterior hip    min []  Vasopneumatic Device, press/temp:    If using vaso (only need to measure limb vaso being performed on)      pre-treatment girth :       post-treatment girth :       measured at (landmark location) :      min []  Other: [x] Skin assessment post-treatment (if applicable):    [x]  intact    []  redness- no adverse reaction                  []redness  adverse reaction:        15 min Therapeutic Activity: Pt ed on HEP progression, use of ankle theraband for strengthening L LE   Rationale:      increase ROM and increase strength to improve the patients ability to perform ADLs, amb   25 min Therapeutic Exercise:  [x]  See flow sheet   Rationale:      increase ROM and increase strength to improve the patients ability to perform standing, walking , sleeping through the night   10 min Manual Therapy: Technique:      [x] S/DTM []IASTM []PROM [] Passive Stretching   []manual TPR    []Jt manipulation:Gr I [] II []  III [] IV[] V[]  Treatment Area:  L lateral and posterior hip in supine, ed on TrP release to L gluteal region, piriformis   Rationale:      decrease pain, increase ROM, increase tissue extensibility and decrease edema  to improve patient's ability toimprove tissue mobility in functional ADLs  The manual therapy interventions were performed at a separate and distinct time from the therapeutic activities interventions. Billed With/As:   [x] TE   [x] TA   [] Neuro   [] Self Care Patient Education: [x] Review HEP  [] Progressed/Changed HEP based on:   [] positioning   [] body mechanics   [] transfers   [] heat/ice application    [] other:        Other Objective/Functional Measures:    Pt ed on use of ankle TB for inversion, eversion HEP. Pt with review of HEP to increase LE strength and ROM. Added BKFO and ed on S/L clams. Added ball squeeze for hip adduction isometric. Post Treatment Pain Level (on 0 to 10) scale:   0  / 10     ASSESSMENT    Assessment/Changes in Function:   Pt verbal cues and tactile cues for LE ankle motion vs knee. Pt with ed on use of sidelying clams, BKFO with responses monitored.      []  See Progress Note/Recertification   Patient will continue to benefit from skilled PT services to modify and progress therapeutic interventions, address functional mobility deficits, address ROM deficits, address strength deficits, analyze and address soft tissue restrictions and instruct in home and community integration to attain remaining goals. Progress toward goals / Updated goals:    Fair Progress to    [x] STG    [] LTG  1 as shown by HEP progression with hip adduction, abduction.        PLAN    [x]  Upgrade activities as tolerated YES Continue plan of care   []  Discharge due to :    []  Other:      Therapist: Miladys Jay PT    Date: 9/21/2021 Time: 12 PM     Future Appointments   Date Time Provider Omar Brooks   9/21/2021 11:45 AM Sanket Santos, PT [facility]   9/23/2021  3:00 PM Cuong Rodríguez PTA [facility]   9/30/2021 11:45 AM Sanket Santos, PT [facility]   10/5/2021 11:45 AM Sanket Santos, PT [facility]   10/7/2021 11:45 AM Sanket Santos, PT [facility]   10/12/2021 11:45 AM Devonte Lopez, PT [facility]   10/14/2021 11:45 AM Sanket Santos, PT [facility]

## 2021-09-23 ENCOUNTER — HOSPITAL ENCOUNTER (OUTPATIENT)
Dept: PHYSICAL THERAPY | Age: 71
End: 2021-09-23
Payer: COMMERCIAL

## 2021-09-24 ENCOUNTER — HOSPITAL ENCOUNTER (OUTPATIENT)
Dept: PHYSICAL THERAPY | Age: 71
Discharge: HOME | End: 2021-09-24
Payer: COMMERCIAL

## 2021-09-24 PROCEDURE — 97110 THERAPEUTIC EXERCISES: CPT

## 2021-09-24 PROCEDURE — 97140 MANUAL THERAPY 1/> REGIONS: CPT

## 2021-09-24 NOTE — PROGRESS NOTES
PHYSICAL THERAPY - DAILY TREATMENT NOTE     Patient Name: Joanna Garcia        Date: 2021  : 1950   YES Patient  Verified  Visit #:     12  Insurance: Payor: Norman Watson / [address]: [address] / Product Type: Commerical /      In time: 2:47 Out time: 3:30 pm   Total Treatment Time: 43     Medicare/[employer] Time Tracking (below)   Total Timed Codes (min):  - 1:1 Treatment Time:  -     TREATMENT AREA =  Pain in left foot [M79.672]  Pain in left hip [M25.552]    SUBJECTIVE    Pain Level (on 0 to 10 scale):  0 / 10-foot; LB: 7/10   Medication Changes/New allergies or changes in medical history, any new surgeries or procedures? NO    If yes, update Summary List   Subjective Functional Status/Changes:  []  No changes reported       Functional improvements:   unctional impairments: \"I think I slept too long on my back. \"  Pt planning to return to work the week of Oct 19 th  Follow up with MD next Tuesday       OBJECTIVE  Modalities Rationale:     decrease edema, decrease inflammation, decrease pain and increase tissue extensibility to improve patient's ability to perform amb   min [] Estim, type/location:                                      []  att     []  unatt     []  w/US     []  w/ice    []  w/heat    min []  Mechanical Traction: type/lbs                   []  pro   []  sup   []  int   []  cont    []  before manual    []  after manual    min []  Ultrasound, settings/location:      min []  Iontophoresis w/ dexamethasone, location:                                               []  take home patch       []  in clinic   10 min [x]  Ice     []  Heat    location/position: Supine LE elevation with CP L posterior knee and L anterior hip    min []  Vasopneumatic Device, press/temp:    If using vaso (only need to measure limb vaso being performed on)      pre-treatment girth :       post-treatment girth :       measured at (landmark location) :      min []  Other:    [x] Skin assessment post-treatment (if applicable):    [x]  intact    []  redness- no adverse reaction                  []redness - adverse reaction:          23 min Therapeutic Exercise:  [x]  See flow sheet   Rationale:      increase ROM and increase strength to improve the patients ability to perform standing, walking , sleeping through the night   10 min Manual Therapy: Technique:      [x] S/DTM []IASTM []PROM [] Passive Stretching   []manual TPR    []Jt manipulation:Gr I [] II []  III [] IV[] V[]  Treatment Area:  L lateral and posterior hip in supine, ed on TrP release to L gluteal region, piriformis   Rationale:      decrease pain, increase ROM, increase tissue extensibility and decrease edema  to improve patient's ability toimprove tissue mobility in functional ADLs  The manual therapy interventions were performed at a separate and distinct time from the therapeutic activities interventions. Billed With/As:   [x] TE   [x] TA   [] Neuro   [] Self Care Patient Education: [x] Review HEP  [] Progressed/Changed HEP based on:   [] positioning   [] body mechanics   [] transfers   [] heat/ice application    [] other:        Other Objective/Functional Measures: Add standing hip abd, flexion LLE only   Post Treatment Pain Level (on 0 to 10) scale:   0  / 10     ASSESSMENT    Assessment/Changes in Function:   Pt reporting using SPC in house ~40-50% of time  Advanced LE strengthening  Recommended pt to bring in regular shoe to next appointment for standing exercise as tolerated     []  See Progress Note/Recertification   Patient will continue to benefit from skilled PT services to modify and progress therapeutic interventions, address functional mobility deficits, address ROM deficits, address strength deficits, analyze and address soft tissue restrictions and instruct in home and community integration to attain remaining goals.       Progress toward goals / Updated goals:    Fair Progress to    [x] STG    [] LTG  1 as shown by Progressive use of SPC in home, good compliance with HEP     PLAN    [x]  Upgrade activities as tolerated YES Continue plan of care   []  Discharge due to :    []  Other:      Therapist: Yolanda Hopkins PTA    Date: 9/24/2021 Time: 3:30  PM     Future Appointments   Date Time Provider Omar Brooks   9/24/2021  3:00 PM Melanie Webb PTA [facility]   9/30/2021 11:45 AM Ginger Ann, PT [facility]   10/5/2021 11:45 AM Ginger Ann, PT [facility]   10/7/2021 11:45 AM Ginger Ann, PT [facility]   10/12/2021 11:45 AM Maryanne Guido, PT [facility]   10/14/2021 11:45 AM Ginger Ann, PT [facility]

## 2021-09-27 ENCOUNTER — APPOINTMENT (OUTPATIENT)
Dept: PHYSICAL THERAPY | Age: 71
End: 2021-09-27
Payer: COMMERCIAL

## 2021-09-30 ENCOUNTER — APPOINTMENT (OUTPATIENT)
Dept: PHYSICAL THERAPY | Age: 71
End: 2021-09-30
Payer: COMMERCIAL

## 2021-10-05 ENCOUNTER — HOSPITAL ENCOUNTER (OUTPATIENT)
Dept: PHYSICAL THERAPY | Age: 71
Discharge: HOME | End: 2021-10-05
Payer: COMMERCIAL

## 2021-10-05 PROCEDURE — 97116 GAIT TRAINING THERAPY: CPT

## 2021-10-05 PROCEDURE — 97140 MANUAL THERAPY 1/> REGIONS: CPT

## 2021-10-05 PROCEDURE — 97110 THERAPEUTIC EXERCISES: CPT

## 2021-10-05 NOTE — PROGRESS NOTES
PHYSICAL THERAPY - DAILY TREATMENT NOTE     Patient Name: Hardeep Dawn        Date: 10/5/2021  : 1950   YES Patient  Verified  Visit #:     12  Insurance: Payor: Orville Sapp / [address] / Product Type: Commerical /      In time: 11:50 am Out time: 12:53 pm   Total Treatment Time: 63     Medicare/[facility] Time Tracking (below)   Total Timed Codes (min):  - 1:1 Treatment Time:  -     TREATMENT AREA =  Pain in left foot [M79.672]  Pain in left hip [M25.552]    SUBJECTIVE    Pain Level (on 0 to 10 scale):  0 / 10-foot; LB: 3-4/10   Medication Changes/New allergies or changes in medical history, any new surgeries or procedures? NO    If yes, update Summary List   Subjective Functional Status/Changes:  []  No changes reported   Pt reports she saw MD a week ago. MD ok her to walk normally. Seemed to be happy with progress.  follow up. Functional improvements: Tightness in low back feeling better with self massage; pt wearing sneakers the majority of the day.    Functional impairments:  Constant numbness, mild edema lateral ankle       OBJECTIVE  Modalities Rationale:     decrease edema, decrease inflammation, decrease pain and increase tissue extensibility to improve patient's ability to perform amb   min [] Estim, type/location:                                      []  att     []  unatt     []  w/US     []  w/ice    []  w/heat    min []  Mechanical Traction: type/lbs                   []  pro   []  sup   []  int   []  cont    []  before manual    []  after manual    min []  Ultrasound, settings/location:      min []  Iontophoresis w/ dexamethasone, location:                                               []  take home patch       []  in clinic   10 min [x]  Ice     []  Heat    location/position: Supine LE elevation with CP L posterior knee and L anterior hip    min []  Vasopneumatic Device, press/temp:    If using vaso (only need to measure limb vaso being performed on) pre-treatment girth :       post-treatment girth :       measured at (landmark location) :      min []  Other:    [x] Skin assessment post-treatment (if applicable):    [x]  intact    []  redness- no adverse reaction                  []redness - adverse reaction:          35/ 22 min billed min Therapeutic Exercise:  [x]  See flow sheet   Rationale:      increase ROM and increase strength to improve the patients ability to perform standing, walking , sleeping through the night   9 min Manual Therapy: Technique:      [x] S/DTM []IASTM []PROM [] Passive Stretching   []manual TPR    []Jt manipulation:Gr I [] II []  III [] IV[] V[]  Treatment Area:  L lateral and posterior hip in right side lying, pt education in gentle retrograde massage to left foot/ankle, scar massage around incision; gentle PROM flexion 3rd ray left foot, pt education in gentle isometric flexion of great toe   Rationale:      decrease pain, increase ROM, increase tissue extensibility and decrease edema  to improve patient's ability toimprove tissue mobility in functional ADLs  The manual therapy interventions were performed at a separate and distinct time from the therapeutic activities interventions.   9 min Gait training:  [x]  Even surfaces in clinic ~ 250 feet with SPC and VCs for proper sequencing; curb training off low curb with SBA and VCs for sequencing and safety; gait training in clinic ~ 100 ft without AD WBAT L LE   Rationale:      increase ROM and increase strength to improve the patients ability to perform standing, walking , sleeping through the night     Billed With/As:   [x] TE   [x] TA   [] Neuro   [] Self Care Patient Education: [x] Review HEP  [] Progressed/Changed HEP based on:   [] positioning   [] body mechanics   [] transfers   [] heat/ice application    [] other:        Other Objective/Functional Measures:  Pt was not seen in PT from 9/24/21 to 10/5/21 secondary LBP  Add gait training, supine hamstring stretch, sitting rocker board-mid ranges, supine bridge, resume GT stretch , isometric GT flex   Post Treatment Pain Level (on 0 to 10) scale:   0  / 10     ASSESSMENT    Assessment/Changes in Function:   Pt presenting with gauze strip in stirrup sling to support 4 ray on left foot that MD instructed pt to use to decrease adduction drifting of 4 th ray. Add gait training  With [facility] and without AD to progress safety in gait. []  See Progress Note/Recertification   Patient will continue to benefit from skilled PT services to modify and progress therapeutic interventions, address functional mobility deficits, address ROM deficits, address strength deficits, analyze and address soft tissue restrictions and instruct in home and community integration to attain remaining goals.       Progress toward goals / Updated goals:    Fair Progress to    [x] STG    [] LTG   As shown by progression of gait and weight bearing exercise without sx increase     PLAN    [x]  Upgrade activities as tolerated YES Continue plan of care   []  Discharge due to :    []  Other:      Therapist: Matthew Almaraz PTA    Date: 10/5/2021 Time: 12:53 pm      Future Appointments   Date Time Provider Omar Brooks   10/7/2021 11:45 AM Leydi Reza PT [facility]   10/12/2021 11:30 AM Leydi Reza PT [facility]   10/14/2021 11:45 AM Leydi Reza PT [facility]

## 2021-10-07 ENCOUNTER — HOSPITAL ENCOUNTER (OUTPATIENT)
Dept: PHYSICAL THERAPY | Age: 71
Discharge: HOME | End: 2021-10-07
Payer: COMMERCIAL

## 2021-10-07 PROCEDURE — 97140 MANUAL THERAPY 1/> REGIONS: CPT

## 2021-10-07 PROCEDURE — 97530 THERAPEUTIC ACTIVITIES: CPT

## 2021-10-07 PROCEDURE — 97110 THERAPEUTIC EXERCISES: CPT

## 2021-10-07 NOTE — PROGRESS NOTES
[address] [place] [address] PHYSICAL THERAPY AT [facility] [place], [address]  Phone: [phone]  Fax: [phone]  PROGRESS NOTE  Patient Name: Quita Miguel : 1950   Treatment/Medical Diagnosis: Pain in left foot [M79.672]  Pain in left hip [M25.552]   Referral Source: Debbie Quintana MD     Date of Initial Visit: 21 Attended Visits: 6 Missed Visits: 0     SUMMARY OF TREATMENT  Quita Miguel has been seen at our clinic 2-3x/wk for a total of 6 visits. Pt treatment has consisted of Neuromuscular re-education, therapeutic exercise for ankle ROM, ankle stability, and manual therapy. CURRENT STATUS  Pt with a fair response to physical therapy treatment. She reports improved ability to ambulate with increased WB status, with pt reporting fatigue and limited endurance in standing. She continues to complain of difficulty and increased pain with increased WB activity. Previous Goals:  1) Patient to be independent and compliant with initial HEP to prevent further disability. 2) Patient will report decreased c/o pain to < or = 2/10 at worst  to facilitate increased standing and walking tolerance with manageable sx in the left foot and hip. 3) Patient to report 50% improvement in overall function in preparation for return to recreational activities with manageable sx in the left foot and hip. Prior Level/Current Level:  1) Prior Level: na   Current Level: compliant and independent   Goal Met? yes  2) Prior Level: 8   Current Level: 4/10   Goal Met? no  3) Prior Level: n/a   Current Level: 25% improvement   Goal Met? yes        New Goals to be achieved in __4_  weeks:  1) Patient to be independent & compliant with progressive HEP in preparation for D/C.  2) Patient to ambulate with improved gait on all surfaces and reciprocal gait pattern on stairs w/o AD to allow patient to return to PLF and work related activities.   3) Patient to increase FOTO to >/= to 52 indicating improved functional mobility and independence  RECOMMENDATIONS  Pt with fair to good response to PT POC. Would benefit from continued PT for progression of amb and functional status. Specifics: 2-3x/wk x4 wks    If you have any questions/comments please contact us directly at [phone]. Thank you for allowing us to assist in the care of your patient. Therapist Signature: Aurora Cisneros PT Date: 10/7/2021   Reporting Period:  Certification Period 9/6/21-10/7/21  n/a Time: 3:36 PM   NOTE TO PHYSICIAN:  PLEASE COMPLETE THE ORDERS BELOW AND FAX TO   [facility]: [phone]. If you are unable to process this request in 24 hours please contact our office: [phone].    ___ I have read the above report and request that my patient continue as recommended.   ___ I have read the above report and request that my patient continue therapy with the following changes/special instructions:_________________________________________________________   ___ I have read the above report and request that my patient be discharged from therapy.      Physician Signature:        Date:       Time:

## 2021-10-12 ENCOUNTER — HOSPITAL ENCOUNTER (OUTPATIENT)
Dept: PHYSICAL THERAPY | Age: 71
Discharge: HOME | End: 2021-10-12
Payer: COMMERCIAL

## 2021-10-12 PROCEDURE — 97112 NEUROMUSCULAR REEDUCATION: CPT

## 2021-10-12 PROCEDURE — 97140 MANUAL THERAPY 1/> REGIONS: CPT

## 2021-10-12 PROCEDURE — 97110 THERAPEUTIC EXERCISES: CPT

## 2021-10-12 NOTE — PROGRESS NOTES
PHYSICAL THERAPY - DAILY TREATMENT NOTE     Patient Name: Howie Black        Date: 10/12/2021  : 1950   YES Patient  Verified  Visit #:      15  Insurance: Payor: Tara Pastrana / [facility] [address] [address] / Product Type: Commerical /      In time: 1130 Out time: 1225   Total Treatment Time: 55     Medicare/[facility] Time Tracking (below)   Total Timed Codes (min):  45 1:1 Treatment Time:  45     TREATMENT AREA =  Pain in left foot [M79.672]  Pain in left hip [M25.552]    SUBJECTIVE    Pain Level (on 0 to 10 scale): 1  / 10   Medication Changes/New allergies or changes in medical history, any new surgeries or procedures? NO    If yes, update Summary List   Subjective Functional Status/Changes:  []  No changes reported     Pt reports that 3 months into her post op, she is noting continued tightness, swelling and pain but has been walking, standing more. Is still icing, elevating and having good response to that but swelling does not seem to be reducing as well as she hoped.        OBJECTIVE  Modalities Rationale:     decrease pain to improve patient's ability to perform ADLs with less pain     min [] Estim, type/location:                                      []  att     []  unatt     []  w/US     []  w/ice    []  w/heat    min []  Mechanical Traction: type/lbs                   []  pro   []  sup   []  int   []  cont    []  before manual    []  after manual    min []  Ultrasound, settings/location:      min []  Iontophoresis w/ dexamethasone, location:                                               []  take home patch       []  in clinic    min []  Ice     []  Heat    location/position:     min []  Vasopneumatic Device, press/temp:     min []  Other:    [x] Skin assessment post-treatment (if applicable):    [x]  intact    []  redness- no adverse reaction     []redness - adverse reaction:      25 min Therapeutic Exercise:  [x]  See flow sheet   Rationale:      increase ROM and increase strength to improve the patients ability to perform ADLs     15 min Neuromuscular Re-ed: [x]  See flow sheet   Rationale:      improve coordination, improve balance, and increase proprioception to improve the patients ability to perform ADLs, amb    10 min Manual Therapy: Technique:      [x] S/DTM []IASTM []PROM [] Passive Stretching   []manual TPR    []Jt manipulation:Gr I [] II []  III [] IV[] V[]  Treatment Area: STM L hip and retrograde to L foot. Rationale:      decrease pain and increase postural awareness to improve patient's ability to amb with less pain. The manual therapy interventions were performed at a separate and distinct time from the therapeutic activities interventions. Billed With/As:   [] TE   [] TA   [] Neuro   [] Self Care Patient Education: [x] Review HEP    [] Progressed/Changed HEP based on:   [] positioning   [] body mechanics   [] transfers   [] heat/ice application    [] other:        Other Objective/Functional Measures:    Progressed to standing at treatment table vs parallel bars. Advanced pt reps with standing there ex. Post Treatment Pain Level (on 0 to 10) scale:   1  / 10     ASSESSMENT    Assessment/Changes in Function:     Pt able to perform: B hip 3 way with exception of adduction. Unable to perform hip abd without: slight lean, ed and improved form noted. []  See Progress Note/Recertification   Patient will continue to benefit from skilled PT services to modify and progress therapeutic interventions, address functional mobility deficits, address ROM deficits, and address strength deficits to attain remaining goals. Progress toward goals / Updated goals:  Fair progress toward recently updated goals.      PLAN    [x]  Upgrade activities as tolerated YES Continue plan of care   []  Discharge due to :    []  Other:      Therapist: Aurora Cisneros PT    Date: 10/12/2021 Time: 11:32 AM     Future Appointments   Date Time Provider Omar Brooks   10/14/2021 11:45 AM Rachel Armando Carrillo, PT [facility]   10/19/2021 12:15 PM Manuel Palomares PTA [facility]   10/21/2021 11:30 AM Manuel Palomares PTA [facility]   10/26/2021 11:30 AM Manuel Palomares PTA [facility]   10/28/2021 12:15 PM Manuel Palomares PTA [facility]

## 2021-10-14 ENCOUNTER — HOSPITAL ENCOUNTER (OUTPATIENT)
Dept: PHYSICAL THERAPY | Age: 71
Discharge: HOME | End: 2021-10-14
Payer: COMMERCIAL

## 2021-10-14 ENCOUNTER — APPOINTMENT (OUTPATIENT)
Dept: PHYSICAL THERAPY | Age: 71
End: 2021-10-14
Payer: COMMERCIAL

## 2021-10-14 PROCEDURE — 97112 NEUROMUSCULAR REEDUCATION: CPT

## 2021-10-14 PROCEDURE — 97110 THERAPEUTIC EXERCISES: CPT

## 2021-10-14 NOTE — PROGRESS NOTES
PHYSICAL THERAPY - DAILY TREATMENT NOTE     Patient Name: Lorrine Klinefelter        Date: 10/14/2021  : 1950   YES Patient  Verified  Visit #:   8   of   15  Insurance: Payor: Odessa Bermudez / Plan: [address] / Product Type: Commerical /      In time: 2:08 pm Out time: 3:08    Total Treatment Time: 50     Medicare/[facility] Time Tracking (below)   Total Timed Codes (min):  - 1:1 Treatment Time: -     TREATMENT AREA =  Pain in left foot [M79.672]  Pain in left hip [M25.552]    SUBJECTIVE    Pain Level (on 0 to 10 scale):  0-1  / 10   Medication Changes/New allergies or changes in medical history, any new surgeries or procedures?     NO    If yes, update Summary List   Subjective Functional Status/Changes:  []  No changes reported       Functional improvements: she was able to stand to  cook dinner and wash dishes ~ 1 1/2 hours; she got on the floor to do HEP last night and scooted to get back on the couch has helped her back feel better  Functional impairments: had to take smaller steps after cooking dinner, pt concerned  About edema in foot and ankle       OBJECTIVE  Modalities Rationale:     decrease edema, decrease inflammation, decrease pain and increase tissue extensibility to improve patient's ability to perform prolonged walking, standing   min [] Estim, type/location:                                      []  att     []  unatt     []  w/US     []  w/ice    []  w/heat    min []  Mechanical Traction: type/lbs                   []  pro   []  sup   []  int   []  cont    []  before manual    []  after manual    min []  Ultrasound, settings/location:      min []  Iontophoresis w/ dexamethasone, location:                                               []  take home patch       []  in clinic   10 min [x]  Ice     []  Heat    location/position: Supine LE elevated left posterior knee, left hip    min []  Vasopneumatic Device, press/temp:    If using vaso (only need to measure limb vaso being performed on) pre-treatment girth :       post-treatment girth :       measured at (landmark location) :      min []  Other:    [x] Skin assessment post-treatment (if applicable):    [x]  intact    []  redness- no adverse reaction                  []redness - adverse reaction:      24 min Therapeutic Exercise:  [x]  See flow sheet   Rationale:      increase ROM and increase strength to improve the patients ability toperform prolonged walking, standing    11 min Neuromuscular Re-ed: [x]  See flow sheet   Rationale:      improve coordination, improve balance and increase proprioception to improve the patients ability to perform prolonged walking, standing    5 min Manual Therapy: Technique:      [x] S/DTM []IASTM []PROM [] Passive Stretching   []manual TPR    []Jt manipulation:Gr I [] II []  III [] IV[] V[]  Treatment Area:  retograde massage to left lower compartment; CFM to left ITB in supine   Rationale:      decrease pain, increase ROM, increase tissue extensibility and decrease edema  to improve patient's ability to improve tissue mobility in ADLs  The manual therapy interventions were performed at a separate and distinct time from the therapeutic activities interventions. Billed With/As:   [] TE   [] TA   [] Neuro   [] Self Care Patient Education: [x] Review HEP    [] Progressed/Changed HEP based on:   [] positioning   [] body mechanics   [] transfers   [] heat/ice application    [] other:        Other Objective/Functional Measures: Add floor taps left LE only 5 x each, supine ITB stretch with belt, resume core stabilization exercise     Post Treatment Pain Level (on 0 to 10) scale:   0  / 10     ASSESSMENT    Assessment/Changes in Function:   Mild antalgic gait without AD in regular shoe flat surfaces in clinic  Advanced stabilization exercise per flow sheet. Discussed discharge planning and self progression of ambulation without AD in home as tolerated.    Noted decreased edema in left ankle, lower calf after manual techniques. Reviewed retrograde massage technique. []  See Progress Note/Recertification   Patient will continue to benefit from skilled PT services to modify and progress therapeutic interventions, address functional mobility deficits, address ROM deficits, address strength deficits, analyze and address soft tissue restrictions, analyze and cue movement patterns, analyze and modify body mechanics/ergonomics and instruct in home and community integration to attain remaining goals.       Progress toward goals / Updated goals:    Good Progress to    [] STG    [x] LTG  1 as shown by pt demonstrating decreasing antalgic gait without AD; pt self report -standing for longer periods       PLAN    [x]  Upgrade activities as tolerated YES Continue plan of care   []  Discharge due to :    []  Other:      Therapist: Marvin Vaughn PTA    Date: 10/14/2021 Time: 3:12  PM     Future Appointments   Date Time Provider Omar Brooks   10/14/2021  2:15 PM Shandra Garcias PTA [facility]   10/19/2021 12:15 PM Shandra Garcias PTA [facility]   10/21/2021 11:30 AM Shandra Garcias PTA [facility]   10/26/2021 11:30 AM Shandra Garcias PTA [facility]   10/28/2021 12:15 PM Shandra Garcias PTA [facility]

## 2021-10-19 ENCOUNTER — HOSPITAL ENCOUNTER (OUTPATIENT)
Dept: PHYSICAL THERAPY | Age: 71
End: 2021-10-19
Payer: COMMERCIAL

## 2021-10-21 ENCOUNTER — APPOINTMENT (OUTPATIENT)
Dept: PHYSICAL THERAPY | Age: 71
End: 2021-10-21
Payer: COMMERCIAL

## 2021-10-26 ENCOUNTER — HOSPITAL ENCOUNTER (OUTPATIENT)
Dept: PHYSICAL THERAPY | Age: 71
Discharge: HOME | End: 2021-10-26
Payer: COMMERCIAL

## 2021-10-26 PROCEDURE — 97110 THERAPEUTIC EXERCISES: CPT

## 2021-10-26 PROCEDURE — 97112 NEUROMUSCULAR REEDUCATION: CPT

## 2021-10-26 NOTE — PROGRESS NOTES
PHYSICAL THERAPY - DAILY TREATMENT NOTE     Patient Name: Felix Sanon        Date: 10/26/2021  : 1950   YES Patient  Verified  Visit #:   9  of   15  Insurance: Payor: Christian Lopez / Plan: [address] PT / Product Type: Commerical /      In time: 11:30  Out time: 12:36 pm   Total Treatment Time: 66     Medicare/[facility] Time Tracking (below)   Total Timed Codes (min):  - 1:1 Treatment Time: -     TREATMENT AREA =  Pain in left foot [M79.672]  Pain in left hip [M25.552]    SUBJECTIVE    Pain Level (on 0 to 10 scale):  0-1  / 10   Medication Changes/New allergies or changes in medical history, any new surgeries or procedures? NO    If yes, update Summary List   Subjective Functional Status/Changes:  []  No changes reported       Pt was not her last week secondary to feeling ill. Planning to return to work part time next Monday.      OBJECTIVE  Modalities Rationale:     decrease edema, decrease inflammation, decrease pain and increase tissue extensibility to improve patient's ability to perform prolonged walking, standing   min [] Estim, type/location:                                      []  att     []  unatt     []  w/US     []  w/ice    []  w/heat    min []  Mechanical Traction: type/lbs                   []  pro   []  sup   []  int   []  cont    []  before manual    []  after manual    min []  Ultrasound, settings/location:      min []  Iontophoresis w/ dexamethasone, location:                                               []  take home patch       []  in clinic   10 min [x]  Ice     []  Heat    location/position: Supine LE elevated left posterior knee, left hip    min []  Vasopneumatic Device, press/temp:    If using vaso (only need to measure limb vaso being performed on)      pre-treatment girth :       post-treatment girth :       measured at (landmark location) :      min []  Other:    [x] Skin assessment post-treatment (if applicable):    [x]  intact    []  redness- no adverse reaction []redness - adverse reaction:      32 min Therapeutic Exercise:  [x]  See flow sheet   Rationale:      increase ROM and increase strength to improve the patients ability toperform prolonged walking, standing    24 min Neuromuscular Re-ed: [x]  See flow sheet   Rationale:      improve coordination, improve balance and increase proprioception to improve the patients ability to perform prolonged walking, standing    n/a min Manual Therapy: Technique:      [x] S/DTM []IASTM []PROM [] Passive Stretching   []manual TPR    []Jt manipulation:Gr I [] II []  III [] IV[] V[]  Treatment Area:  retograde massage to left lower compartment; CFM to left ITB in supine   Rationale:      decrease pain, increase ROM, increase tissue extensibility and decrease edema  to improve patient's ability to improve tissue mobility in ADLs  The manual therapy interventions were performed at a separate and distinct time from the therapeutic activities interventions. Billed With/As:   [] TE   [] TA   [] Neuro   [] Self Care Patient Education: [x] Review HEP    [] Progressed/Changed HEP based on:   [] positioning   [] body mechanics   [] transfers   [] heat/ice application    [] other:        Other Objective/Functional Measures:     Add neutral spine posture in sitting , stanidng     Post Treatment Pain Level (on 0 to 10) scale:   0  / 10     ASSESSMENT    Assessment/Changes in Function:   NMR for static sitting, standing postures, body mechanics education in bending, reaching, use of HEP for sx management  Advanced LE strengthening in standing to ready pt for return to work activities  Reviewed proper abdominal draw technique with use of breathing for recruitment   []  See Progress Note/Recertification   Patient will continue to benefit from skilled PT services to modify and progress therapeutic interventions, address functional mobility deficits, address ROM deficits, address strength deficits, analyze and address soft tissue restrictions, analyze and cue movement patterns, analyze and modify body mechanics/ergonomics and instruct in home and community integration to attain remaining goals.       Progress toward goals / Updated goals:    Good Progress to    [] STG    [x] LTG as shown by pt demonstrating decreasing fatigue with exercise, pt self report of decreasing pain levels in ADLs       PLAN    [x]  Upgrade activities as tolerated YES Continue plan of care   []  Discharge due to :    []  Other:      Therapist: Waqas Robbins PTA    Date: 10/26/2021 Time: 12:36   PM     Future Appointments   Date Time Provider Omar Brooks   10/28/2021 12:15 PM Karie Castillo PTA [facility]

## 2021-10-28 ENCOUNTER — HOSPITAL ENCOUNTER (OUTPATIENT)
Dept: PHYSICAL THERAPY | Age: 71
Discharge: HOME | End: 2021-10-28
Payer: COMMERCIAL

## 2021-10-28 PROCEDURE — 97112 NEUROMUSCULAR REEDUCATION: CPT

## 2021-10-28 PROCEDURE — 97110 THERAPEUTIC EXERCISES: CPT

## 2021-10-28 NOTE — PROGRESS NOTES
PHYSICAL THERAPY - DAILY TREATMENT NOTE     Patient Name: Luke Leary        Date: 10/28/2021  : 1950   YES Patient  Verified  Visit #: 10  of   15  Insurance: Payor: [employer] / Plan: [address] / Product Type: Commerical /      In time: 12:15 Out time: 1:15    Total Treatment Time: 60     Medicare/BCBS Time Tracking (below)   Total Timed Codes (min):  - 1:1 Treatment Time: -     TREATMENT AREA =  Pain in left foot [M79.672]  Pain in left hip [M25.552]    SUBJECTIVE    Pain Level (on 0 to 10 scale):  0-1  / 10   Medication Changes/New allergies or changes in medical history, any new surgeries or procedures?     NO    If yes, update Summary List   Subjective Functional Status/Changes:  []  No changes reported       Pt reports she has been using pillow for lumbar support in sitting  Intermittent sharp pain in toes of left foot in the last couple of days     OBJECTIVE  Modalities Rationale:     decrease edema, decrease inflammation, decrease pain and increase tissue extensibility to improve patient's ability to perform prolonged walking, standing   min [] Estim, type/location:                                      []  att     []  unatt     []  w/US     []  w/ice    []  w/heat    min []  Mechanical Traction: type/lbs                   []  pro   []  sup   []  int   []  cont    []  before manual    []  after manual    min []  Ultrasound, settings/location:      min []  Iontophoresis w/ dexamethasone, location:                                               []  take home patch       []  in clinic   10 min [x]  Ice     []  Heat    location/position: Supine LE elevated left posterior knee, left hip    min []  Vasopneumatic Device, press/temp:    If using vaso (only need to measure limb vaso being performed on)      pre-treatment girth :       post-treatment girth :       measured at (landmark location) :      min []  Other:    [x] Skin assessment post-treatment (if applicable):    [x]  intact    [] redness- no adverse reaction                  []redness - adverse reaction:      34 min Therapeutic Exercise:  [x]  See flow sheet   Rationale:      increase ROM and increase strength to improve the patients ability toperform prolonged walking, standing    16 min Neuromuscular Re-ed: [x]  See flow sheet   Rationale:      improve coordination, improve balance and increase proprioception to improve the patients ability to perform prolonged walking, standing    n/a min Manual Therapy: Technique:      [x] S/DTM []IASTM []PROM [] Passive Stretching   []manual TPR    []Jt manipulation:Gr I [] II []  III [] IV[] V[]  Treatment Area:  retograde massage to left lower compartment; CFM to left ITB in supine   Rationale:      decrease pain, increase ROM, increase tissue extensibility and decrease edema  to improve patient's ability to improve tissue mobility in ADLs  The manual therapy interventions were performed at a separate and distinct time from the therapeutic activities interventions.         Billed With/As:   [] TE   [] TA   [] Neuro   [] Self Care Patient Education: [x] Review HEP    [] Progressed/Changed HEP based on:   [] positioning   [] body mechanics   [] transfers   [] heat/ice application    [] other:        Other Objective/Functional Measures:  Hold tband INV, EV resume tband DF, PF, hold standing toe taps     Post Treatment Pain Level (on 0 to 10) scale:   0  / 10     ASSESSMENT    Assessment/Changes in Function:   Review neutral spine posture in sitting and standing  Updated written HEP, use of ice, positioning    []  See Progress Note/Recertification   Patient will continue to benefit from skilled PT services to modify and progress therapeutic interventions, address functional mobility deficits, address ROM deficits, address strength deficits, analyze and address soft tissue restrictions, analyze and cue movement patterns, analyze and modify body mechanics/ergonomics and instruct in home and community integration to attain remaining goals. Progress toward goals / Updated goals:    Good Progress to    [] STG    [x] LTG pt planning RTW next week       PLAN    [x]  Upgrade activities as tolerated YES Continue plan of care   []  Discharge due to :    []  Other:      Therapist: Ariana Gay PTA    Date: 10/28/2021 Time: 1:15   PM     No future appointments.

## 2021-11-04 ENCOUNTER — APPOINTMENT (OUTPATIENT)
Dept: PHYSICAL THERAPY | Age: 71
End: 2021-11-04

## 2021-11-04 NOTE — PROGRESS NOTES
[facility] PHYSICAL THERAPY AT [address] - Phone: [phone] Fax: [phone]  PROGRESS NOTE  Patient Name: Mone Lambert : 1950   Treatment/Medical Diagnosis: Pain in left foot [M79.672]  Pain in left hip [M25.552]   Referral Source: Mishel Dwyer MD     Date of Initial Visit: 21 Attended Visits: 10 Missed Visits: 0     SUMMARY OF TREATMENT  Mone Lambert has been seen at our clinic 2-3x/wk for a total of 10  visits. Pt treatment has consisted of Neuromuscular re-education, therapeutic exercise for ankle ROM, ankle stability, and manual therapy. CURRENT STATUS  Patient was last seen on 10-28-21. Pt returned to work 2021. Pt called office on 11-3-21 and reported she is planning to return to [place] for consult secondary prior to return to PT and requested progress note to be forwarded. Pt pain status on last visit attended on 10-28-21 was 0-1/10. Goal/Measure of Progress Goal Met? 2. Patient to be independent & compliant with progressive HEP in preparation for D/C. Status at last Eval: Independent and compliant with initial HEP Current Status: D/C HEP currently in progress On going   3. Patient to ambulate with improved gait on all surfaces and reciprocal gait pattern on stairs w/o AD to allow patient to return to PLF and work related activities. Status at last Eval: Ambulation with [facility] Current Status: Unable to formally reassess On going   4.   Patient to increase FOTO to >/= to 52 indicating improved functional mobility and independence   Status at last Eval: 40 Current Status: Unable to formally reassess On going     New Goals to be achieved in __4__  weeks:  1) Patient to be independent & compliant with progressive HEP in preparation for D/C.  2) Patient to ambulate with improved gait on all surfaces and reciprocal gait pattern on stairs w/o AD to allow patient to return to PLF and work related activities. 3) Patient to increase FOTO to >/= to 52 indicating improved functional mobility and independence    RECOMMENDATIONS  Pt with fair to good response to PT POC. Would benefit from continued PT for progression of amb and functional status unless otherwise indicated. Specifics: 2-3x/wk x4 wks  If you have any questions/comments please contact us directly at  [phone]  Thank you for allowing us to assist in the care of your patient. LPTA Signature: Jennifer Sanchez PTA  Date: 11/4/2021   PT Signature: Terri Peña PT, DPT, MTC   Time: 9:51 AM   NOTE TO PHYSICIAN:  PLEASE COMPLETE THE ORDERS BELOW AND FAX TO   [facility]: [phone]  If you are unable to process this request in 24 hours please contact our office:  [phone].    ___ I have read the above report and request that my patient continue as recommended.   ___ I have read the above report and request that my patient continue therapy with the following changes/special instructions:_________________________________________________________   ___ I have read the above report and request that my patient be discharged from therapy.      Physician Signature:        Date:       Time:

## 2021-11-09 ENCOUNTER — APPOINTMENT (OUTPATIENT)
Dept: PHYSICAL THERAPY | Age: 71
End: 2021-11-09

## 2021-12-21 NOTE — PROGRESS NOTES
[facility] PHYSICAL THERAPY AT [facility]. [place], [address]  Phone: [phone]  Fax: [phone] SUMMARY  Patient Name: Kunal Montano : 1950   Treatment/Medical Diagnosis: Pain in left foot [M79.672]  Pain in left hip [M25.552]   Referral Source: Ferny Guzman MD     Date of Initial Visit: 21 Attended Visits: 10 Missed Visits: 3     SUMMARY OF TREATMENT   Physical therapy treatment has consisted of Neuromuscular re-education, therapeutic exercise for ankle ROM, ankle stability, and manual therapy, and ice. CURRENT STATUS  Patient was unable to be formally reassessed secondary to not returning to PT after 10-28-21 visit. Status update from last visit attended:  Patient was last seen on 10-28-21. Pt returned to work 2021. Pt called office on 11-3-21 and reported she is planning to return to [place] for consult secondary prior to return to PT and requested progress note to be forwarded. Pt pain status on last visit attended on 10-28-21 was 0-1/10. Goal/Measure of Progress Goal Met? 2. Patient to be independent & compliant with progressive HEP in preparation for D/C. Status at last Eval: Independent and compliant with initial HEP Current Status: D/C HEP currently in progress On going   3. Patient to ambulate with improved gait on all surfaces and reciprocal gait pattern on stairs w/o AD to allow patient to return to F and work related activities. Status at last Eval: Ambulation with [facility] Current Status: Unable to formally reassess On going   4. Patient to increase FOTO to >/= to 52 indicating improved functional mobility and independence   Status at last Eval: 40 Current Status: Unable to formally reassess On going     RECOMMENDATIONS  Other: pt returned to [place] for follow up. Pt did not return. If you have any questions/comments please contact us directly at [phone].    Thank you for allowing us to assist in the care of your patient.     Therapist Signature: RAZIA Peña PT, DPT, MTC   Date: 10-28-21   Reporting Period:   n/a Time: 1:37 PM